# Patient Record
(demographics unavailable — no encounter records)

---

## 2025-07-28 NOTE — ASSESSMENT
[FreeTextEntry1] :  This is a 31 y/o male who presents today for initial evaluation of right inguinal hernia.  Patient with no significant past medical history and PSHx of open left inguinal hernia repair with mesh with Dr. De. Patient reports since 2022 he has noticed a bulge on his right groin, getting bigger over time and causing more discomfort over time. Denies nausea, vomiting, fever, chills, pain out of proportion, chest pain, HA, dizziness. Denies constipation, difficulty with bowel movements or urination. Denies shortness of breath, DOW, or difficulty breathing, patient can walk / climb 2 flights without stopping. Denies hx of blood clots or bleeding problems.  Exam significant for: + left small scrotal/inguinal hernia  The nature and risks of hernia were discussed as were signs and symptoms of incarceration, obstruction and strangulation as possible risks of observation. A thorough preoperative education has been performed about the role and the different surgical options have been discussed with patient. Risks, benefits and alternatives have been discussed and patient verbalizes understanding. Laparoscopic/Robotic/Open repair of inguinal hernia/abdominal hernia was discussed with the patient at length. The risks, benefits, and alternatives of the types of repair as well as to the use of mesh were discussed and all questions answered. Risks of hernia repair include, but are not limited to infection, bleeding, recurrence and injury to adjacent structures and nerves. Advised patient about possible risk of future complications if hernia worsens or goes untreated. Patient would like to proceed with laparoscopic robotic assisted left inguinal hernia repair.

## 2025-07-28 NOTE — PLAN
[FreeTextEntry1] : Left inguinal/scrota hernia  - F/u standard pre-op labs  - Obtain PCP clearance  - Plan for laparoscopic robotic assisted left inguinal hernia repair  - Go to ED if pain is resistant to pain medication or if symptoms worsen (N/V/fever, chills).

## 2025-07-28 NOTE — ADDENDUM
[FreeTextEntry1] :  I, MARICEL MCDONNELL, am scribing for and in the presence of Dr. Logan for the following sections: HISTORY OF PRESENT ILLNESS, PAST MEDICAL HISTORY, PAST SURGICAL HISTORY, FAMILY/SOCIAL HISTORY, REVIEW OF SYSTEMS, VITAL SIGNS, PHYSICAL EXAM, DISPOSITION. Reviewed current treatment plan, including medications / surgical intervention / lifestyle modifications where indicated. Reviewed imaging, laboratory results, or other diagnostics as applicable. Addressed patient concerns, including potential complications, risk factors, or alternative treatment options as applicable. Provided detailed education on pre- or post-operative instructions, and expected outcome as applicable. Plan of care reviewed with patient, and questions answered. The total time spent with patient included more than 50% of the time dedicated to counseling and coordination of care.

## 2025-07-28 NOTE — PHYSICAL EXAM
[No Rash or Lesion] : No rash or lesion [Alert] : alert [Oriented to Person] : oriented to person [Oriented to Place] : oriented to place [Oriented to Time] : oriented to time [Calm] : calm [de-identified] : well appearing, in no acute distress [de-identified] : +small left scrotal hernia [de-identified] : wnl

## 2025-07-28 NOTE — HISTORY OF PRESENT ILLNESS
[de-identified] :  This is a 31 y/o male who presents today for initial evaluation of right inguinal hernia.  Patient with no significant past medical history and PSHx of open left inguinal hernia repair with mesh with Dr. De. Patient reports since 2022 he has noticed a bulge on his right groin, getting bigger and causing more discomfort over time. Denies nausea, vomiting, fever, chills, pain out of proportion, chest pain, HA, dizziness. Denies constipation, difficulty with bowel movements or urination. Denies shortness of breath, DOW, or difficulty breathing, patient can walk / climb 2 flights without stopping. Denies hx of blood clots or bleeding problems.

## 2025-07-28 NOTE — ASSESSMENT
[FreeTextEntry1] :  This is a 29 y/o male who presents today for initial evaluation of right inguinal hernia.  Patient with no significant past medical history and PSHx of open left inguinal hernia repair with mesh with Dr. De. Patient reports since 2022 he has noticed a bulge on his right groin, getting bigger over time and causing more discomfort over time. Denies nausea, vomiting, fever, chills, pain out of proportion, chest pain, HA, dizziness. Denies constipation, difficulty with bowel movements or urination. Denies shortness of breath, DOW, or difficulty breathing, patient can walk / climb 2 flights without stopping. Denies hx of blood clots or bleeding problems.  Exam significant for: + left small scrotal/inguinal hernia  The nature and risks of hernia were discussed as were signs and symptoms of incarceration, obstruction and strangulation as possible risks of observation. A thorough preoperative education has been performed about the role and the different surgical options have been discussed with patient. Risks, benefits and alternatives have been discussed and patient verbalizes understanding. Laparoscopic/Robotic/Open repair of inguinal hernia/abdominal hernia was discussed with the patient at length. The risks, benefits, and alternatives of the types of repair as well as to the use of mesh were discussed and all questions answered. Risks of hernia repair include, but are not limited to infection, bleeding, recurrence and injury to adjacent structures and nerves. Advised patient about possible risk of future complications if hernia worsens or goes untreated. Patient would like to proceed with laparoscopic robotic assisted left inguinal hernia repair.

## 2025-07-28 NOTE — HISTORY OF PRESENT ILLNESS
[de-identified] :  This is a 31 y/o male who presents today for initial evaluation of right inguinal hernia.  Patient with no significant past medical history and PSHx of open left inguinal hernia repair with mesh with Dr. De. Patient reports since 2022 he has noticed a bulge on his right groin, getting bigger and causing more discomfort over time. Denies nausea, vomiting, fever, chills, pain out of proportion, chest pain, HA, dizziness. Denies constipation, difficulty with bowel movements or urination. Denies shortness of breath, DOW, or difficulty breathing, patient can walk / climb 2 flights without stopping. Denies hx of blood clots or bleeding problems.

## 2025-07-28 NOTE — PHYSICAL EXAM
[No Rash or Lesion] : No rash or lesion [Alert] : alert [Oriented to Person] : oriented to person [Oriented to Place] : oriented to place [Oriented to Time] : oriented to time [Calm] : calm [de-identified] : well appearing, in no acute distress [de-identified] : +small left scrotal hernia [de-identified] : wnl